# Patient Record
Sex: FEMALE | Race: WHITE
[De-identification: names, ages, dates, MRNs, and addresses within clinical notes are randomized per-mention and may not be internally consistent; named-entity substitution may affect disease eponyms.]

---

## 2018-02-03 ENCOUNTER — HOSPITAL ENCOUNTER (EMERGENCY)
Dept: HOSPITAL 61 - ER | Age: 38
Discharge: HOME | End: 2018-02-03
Payer: SELF-PAY

## 2018-02-03 DIAGNOSIS — E87.6: ICD-10-CM

## 2018-02-03 DIAGNOSIS — R00.0: Primary | ICD-10-CM

## 2018-02-03 DIAGNOSIS — R42: ICD-10-CM

## 2018-02-03 DIAGNOSIS — T50.5X5A: ICD-10-CM
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
## 2018-02-03 LAB
ADD MAN DIFF?: NO
ALBUMIN SERPL-MCNC: 4.3 G/DL (ref 3.4–5)
ALBUMIN/GLOB SERPL: 1 {RATIO} (ref 1–1.7)
ALP SERPL-CCNC: 61 U/L (ref 46–116)
ALT (SGPT): 20 U/L (ref 14–59)
AMPHETAMINE/METHAMPHETAMINE: (no result)
ANION GAP SERPL CALC-SCNC: 13 MMOL/L (ref 6–14)
AST SERPL-CCNC: 17 U/L (ref 15–37)
BACTERIA,URINE: 0 /HPF
BARBITURATES: (no result)
BASO #: 0.1 X10^3/UL (ref 0–0.2)
BASO %: 1 % (ref 0–3)
BENZODIAZEPINES: (no result)
BILIRUBIN,URINE: NEGATIVE
BLOOD UREA NITROGEN: 8 MG/DL (ref 7–20)
BUN/CREAT SERPL: 13 (ref 6–20)
CALCIUM: 8.9 MG/DL (ref 8.5–10.1)
CANNABINOIDS: (no result)
CHLORIDE: 101 MMOL/L (ref 98–107)
CLARITY,URINE: CLEAR
CO2 SERPL-SCNC: 22 MMOL/L (ref 21–32)
COCAINE: (no result)
COLOR,URINE: YELLOW
CREAT SERPL-MCNC: 0.6 MG/DL (ref 0.6–1)
D-DIMER: 0.28 UG/MLFEU (ref 0–0.5)
EOS #: 0.1 X10^3/UL (ref 0–0.7)
EOS %: 1 % (ref 0–3)
ETHANOL, URINE: (no result)
GFR SERPLBLD BASED ON 1.73 SQ M-ARVRAT: 113.1 ML/MIN
GLOBULIN SER-MCNC: 4.1 G/DL (ref 2.2–3.8)
GLUCOSE SERPL-MCNC: 118 MG/DL (ref 70–99)
GLUCOSE,URINE: NEGATIVE MG/DL
HCG SERPL-ACNC: 11.5 X10^3/UL (ref 4–11)
HEMATOCRIT: 41.9 % (ref 36–47)
HEMOGLOBIN: 14.4 G/DL (ref 12–15.5)
LYMPH #: 3.3 X10^3/UL (ref 1–4.8)
LYMPH %: 28 % (ref 24–48)
MEAN CORPUSCULAR HEMOGLOBIN: 29 PG (ref 25–35)
MEAN CORPUSCULAR HGB CONC: 34 G/DL (ref 31–37)
MEAN CORPUSCULAR VOLUME: 85 FL (ref 79–100)
METHADONE: (no result)
MONO #: 0.8 X10^3/UL (ref 0–1.1)
MONO %: 7 % (ref 0–9)
NEUT #: 7.3 X10^3UL (ref 1.8–7.7)
NEUT %: 63 % (ref 31–73)
NITRITE,URINE: NEGATIVE
OPIATES: (no result)
PH,URINE: 7
PHENCYCLIDINE: (no result)
PLATELET COUNT: 373 X10^3/UL (ref 140–400)
POTASSIUM SERPL-SCNC: 2.7 MMOL/L (ref 3.5–5.1)
PROTEIN,URINE: NEGATIVE MG/DL
RBC,URINE: 0 /HPF (ref 0–2)
RED BLOOD COUNT: 4.97 X10^6/UL (ref 3.5–5.4)
RED CELL DISTRIBUTION WIDTH: 13.3 % (ref 11.5–14.5)
SODIUM: 136 MMOL/L (ref 136–145)
SPECIFIC GRAVITY,URINE: <=1.005
SQUAMOUS EPITHELIAL CELL,UR: (no result) /LPF
THYROID STIM HORMONE (TSH): 1.77 UIU/ML (ref 0.36–3.74)
TOTAL BILIRUBIN: 0.2 MG/DL (ref 0.2–1)
TOTAL PROTEIN: 8.4 G/DL (ref 6.4–8.2)
URINE HCG POC: (no result)
UROBILINOGEN,URINE: 0.2 MG/DL
WBC,URINE: 0 /HPF (ref 0–4)

## 2018-02-03 PROCEDURE — 81025 URINE PREGNANCY TEST: CPT

## 2018-02-03 PROCEDURE — 81001 URINALYSIS AUTO W/SCOPE: CPT

## 2018-02-03 PROCEDURE — 99285 EMERGENCY DEPT VISIT HI MDM: CPT

## 2018-02-03 PROCEDURE — 93005 ELECTROCARDIOGRAM TRACING: CPT

## 2018-02-03 PROCEDURE — 36415 COLL VENOUS BLD VENIPUNCTURE: CPT

## 2018-02-03 PROCEDURE — 80307 DRUG TEST PRSMV CHEM ANLYZR: CPT

## 2018-02-03 PROCEDURE — 96361 HYDRATE IV INFUSION ADD-ON: CPT

## 2018-02-03 PROCEDURE — 80053 COMPREHEN METABOLIC PANEL: CPT

## 2018-02-03 PROCEDURE — 85025 COMPLETE CBC W/AUTO DIFF WBC: CPT

## 2018-02-03 PROCEDURE — 85379 FIBRIN DEGRADATION QUANT: CPT

## 2018-02-03 PROCEDURE — 84443 ASSAY THYROID STIM HORMONE: CPT

## 2018-02-03 PROCEDURE — 96374 THER/PROPH/DIAG INJ IV PUSH: CPT

## 2018-02-03 RX ADMIN — KETOROLAC TROMETHAMINE 1 MG: 30 INJECTION, SOLUTION INTRAMUSCULAR at 15:09

## 2018-02-03 RX ADMIN — BACITRACIN 1 MLS/HR: 5000 INJECTION, POWDER, FOR SOLUTION INTRAMUSCULAR at 14:02

## 2019-07-07 ENCOUNTER — HOSPITAL ENCOUNTER (EMERGENCY)
Dept: HOSPITAL 61 - ER | Age: 39
Discharge: HOME | End: 2019-07-07
Payer: COMMERCIAL

## 2019-07-07 VITALS — SYSTOLIC BLOOD PRESSURE: 130 MMHG | DIASTOLIC BLOOD PRESSURE: 76 MMHG

## 2019-07-07 VITALS — WEIGHT: 193 LBS | BODY MASS INDEX: 32.95 KG/M2 | HEIGHT: 64 IN

## 2019-07-07 DIAGNOSIS — N13.30: ICD-10-CM

## 2019-07-07 DIAGNOSIS — R31.9: ICD-10-CM

## 2019-07-07 DIAGNOSIS — N39.0: Primary | ICD-10-CM

## 2019-07-07 DIAGNOSIS — J02.9: ICD-10-CM

## 2019-07-07 DIAGNOSIS — N83.201: ICD-10-CM

## 2019-07-07 LAB
ALBUMIN SERPL-MCNC: 4 G/DL (ref 3.4–5)
ALBUMIN/GLOB SERPL: 1 {RATIO} (ref 1–1.7)
ALP SERPL-CCNC: 68 U/L (ref 46–116)
ALT SERPL-CCNC: 58 U/L (ref 14–59)
AMPHETAMINE/METHAMPHETAMINE: (no result)
ANION GAP SERPL CALC-SCNC: 9 MMOL/L (ref 6–14)
APTT PPP: YELLOW S
AST SERPL-CCNC: 45 U/L (ref 15–37)
BACTERIA #/AREA URNS HPF: (no result) /HPF
BARBITURATES UR-MCNC: (no result) UG/ML
BASOPHILS # BLD AUTO: 0.1 X10^3/UL (ref 0–0.2)
BASOPHILS NFR BLD: 1 % (ref 0–3)
BENZODIAZ UR-MCNC: (no result) UG/L
BILIRUB SERPL-MCNC: 0.4 MG/DL (ref 0.2–1)
BILIRUB UR QL STRIP: NEGATIVE
BUN SERPL-MCNC: 8 MG/DL (ref 7–20)
BUN/CREAT SERPL: 10 (ref 6–20)
CALCIUM SERPL-MCNC: 9 MG/DL (ref 8.5–10.1)
CANNABINOIDS UR-MCNC: (no result) UG/L
CHLORIDE SERPL-SCNC: 100 MMOL/L (ref 98–107)
CO2 SERPL-SCNC: 27 MMOL/L (ref 21–32)
COCAINE UR-MCNC: (no result) NG/ML
CREAT SERPL-MCNC: 0.8 MG/DL (ref 0.6–1)
EOSINOPHIL NFR BLD: 0.2 X10^3/UL (ref 0–0.7)
EOSINOPHIL NFR BLD: 3 % (ref 0–3)
ERYTHROCYTE [DISTWIDTH] IN BLOOD BY AUTOMATED COUNT: 13.9 % (ref 11.5–14.5)
FIBRINOGEN PPP-MCNC: CLEAR MG/DL
GFR SERPLBLD BASED ON 1.73 SQ M-ARVRAT: 80.7 ML/MIN
GLOBULIN SER-MCNC: 4.2 G/DL (ref 2.2–3.8)
GLUCOSE SERPL-MCNC: 159 MG/DL (ref 70–99)
HCT VFR BLD CALC: 41.1 % (ref 36–47)
HGB BLD-MCNC: 14.4 G/DL (ref 12–15.5)
LYMPHOCYTES # BLD: 1.8 X10^3/UL (ref 1–4.8)
LYMPHOCYTES NFR BLD AUTO: 25 % (ref 24–48)
MCH RBC QN AUTO: 30 PG (ref 25–35)
MCHC RBC AUTO-ENTMCNC: 35 G/DL (ref 31–37)
MCV RBC AUTO: 86 FL (ref 79–100)
METHADONE SERPL-MCNC: (no result) NG/ML
MONO #: 1.1 X10^3/UL (ref 0–1.1)
MONOCYTES NFR BLD: 14 % (ref 0–9)
NEUT #: 4.3 X10^3UL (ref 1.8–7.7)
NEUTROPHILS NFR BLD AUTO: 58 % (ref 31–73)
NITRITE UR QL STRIP: NEGATIVE
OPIATES UR-MCNC: (no result) NG/ML
PCP SERPL-MCNC: (no result) MG/DL
PH UR STRIP: 7 [PH]
PLATELET # BLD AUTO: 311 X10^3/UL (ref 140–400)
POTASSIUM SERPL-SCNC: 3.3 MMOL/L (ref 3.5–5.1)
PROT SERPL-MCNC: 8.2 G/DL (ref 6.4–8.2)
PROT UR STRIP-MCNC: NEGATIVE MG/DL
RBC # BLD AUTO: 4.78 X10^6/UL (ref 3.5–5.4)
RBC #/AREA URNS HPF: (no result) /HPF (ref 0–2)
SODIUM SERPL-SCNC: 136 MMOL/L (ref 136–145)
SQUAMOUS #/AREA URNS LPF: (no result) /LPF
UROBILINOGEN UR-MCNC: 0.2 MG/DL
WBC # BLD AUTO: 7.4 X10^3/UL (ref 4–11)
WBC #/AREA URNS HPF: (no result) /HPF (ref 0–4)

## 2019-07-07 PROCEDURE — 36415 COLL VENOUS BLD VENIPUNCTURE: CPT

## 2019-07-07 PROCEDURE — 87070 CULTURE OTHR SPECIMN AEROBIC: CPT

## 2019-07-07 PROCEDURE — 81001 URINALYSIS AUTO W/SCOPE: CPT

## 2019-07-07 PROCEDURE — 85025 COMPLETE CBC W/AUTO DIFF WBC: CPT

## 2019-07-07 PROCEDURE — 87880 STREP A ASSAY W/OPTIC: CPT

## 2019-07-07 PROCEDURE — 74176 CT ABD & PELVIS W/O CONTRAST: CPT

## 2019-07-07 PROCEDURE — 99285 EMERGENCY DEPT VISIT HI MDM: CPT

## 2019-07-07 PROCEDURE — 87086 URINE CULTURE/COLONY COUNT: CPT

## 2019-07-07 PROCEDURE — 80307 DRUG TEST PRSMV CHEM ANLYZR: CPT

## 2019-07-07 PROCEDURE — 81025 URINE PREGNANCY TEST: CPT

## 2019-07-07 PROCEDURE — 93005 ELECTROCARDIOGRAM TRACING: CPT

## 2019-07-07 PROCEDURE — 80053 COMPREHEN METABOLIC PANEL: CPT

## 2019-07-07 NOTE — RAD
Exam performed: CT abdomen and pelvis without contrast

 

HISTORY: Bilateral flank pain.

 

DATE OF SERVICE: 7/7/2019.

 

COMPARISON: None available

 

TECHNIQUE: Contiguous helical acquisitions are obtained through the 

abdomen and pelvis without IV contrast. Sagittal and coronal reformatted 

images are obtained and reviewed.

 

FINDINGS:

 

Lack of IV contrast limits evaluation of abdominal viscera, however there 

is mild hepatomegaly. Spleen, pancreas and gallbladder are normal. Both 

adrenal glands and bilateral kidneys are normal in size. Mild right 

hydronephrosis and mild dilation of the right upper ureter is seen. No 

obstructing calculus is identified. The distal third of ureter is not 

clearly seen. Aorta is normal in caliber. Small and large bowel loops are 

nondilated and unremarkable. Visualized presumed appendix is normal.

 

Urinary bladder is normal. Uterus is anteverted. There is IUD in place. 

Right ovarian cysts likely physiological. No free fluid. Bones are 

essentially normal.

 

IMPRESSION:

 

1.  Mild fullness of the right collecting system and upper ureter without 

definite obstructing calculus. Findings may be related to mild stricture 

or recently passed calculus although no calculus is seen in the urinary 

bladder.

2.  Right ovarian cysts likely physiological.

3.  Mild hepatomegaly.

 

PQRS Compliance Statement:

 

One or more of the following individualized dose reduction techniques were

utilized for this examination:  

1. Automated exposure control  

2. Adjustment of the mA and/or kV according to patient size  

3. Use of iterative reconstruction technique

 

Electronically signed by: Sherie Villegas MD (7/7/2019 2:58 PM) [facility]

## 2019-07-08 NOTE — EKG
[facility]

              [address] [place] [address]

Test Date:    2019               Test Time:    12:56:09

Pat Name:     DEVEN ARAUJO Department:   

Patient ID:   [id number]           Room:          

Gender:       F                        Technician:   

:          1980               Requested By: CLEMENT KATZ

Order Number: [id number]           Reading MD:     

                                 Measurements

Intervals                              Axis          

Rate:         132                      P:            

PA:                                    QRS:          54

QRSD:         72                       T:            31

QT:           342                                    

QTc:          510                                    

                           Interpretive Statements

SUPRAVENTRICULAR TACHYCARDIA

T ABNORMALITY IN ANTERIOR LEADS

ABNORMAL ECG

RI6.01

No previous ECG available for comparison

## 2021-09-01 ENCOUNTER — HOSPITAL ENCOUNTER (EMERGENCY)
Dept: HOSPITAL 61 - ER | Age: 41
Discharge: HOME | End: 2021-09-01
Payer: COMMERCIAL

## 2021-09-01 VITALS — HEIGHT: 65 IN | BODY MASS INDEX: 30.71 KG/M2 | WEIGHT: 184.31 LBS

## 2021-09-01 VITALS — DIASTOLIC BLOOD PRESSURE: 66 MMHG | SYSTOLIC BLOOD PRESSURE: 110 MMHG

## 2021-09-01 DIAGNOSIS — N12: Primary | ICD-10-CM

## 2021-09-01 LAB
ALBUMIN SERPL-MCNC: 3.6 G/DL (ref 3.4–5)
ALBUMIN/GLOB SERPL: 0.9 {RATIO} (ref 1–1.7)
ALP SERPL-CCNC: 50 U/L (ref 46–116)
ALT SERPL-CCNC: 26 U/L (ref 14–59)
ANION GAP SERPL CALC-SCNC: 9 MMOL/L (ref 6–14)
APTT PPP: YELLOW S
AST SERPL-CCNC: 21 U/L (ref 15–37)
BACTERIA #/AREA URNS HPF: (no result) /HPF
BASOPHILS # BLD AUTO: 0.1 X10^3/UL (ref 0–0.2)
BASOPHILS NFR BLD: 0 % (ref 0–3)
BILIRUB SERPL-MCNC: 0.6 MG/DL (ref 0.2–1)
BILIRUB UR QL STRIP: NEGATIVE
BUN SERPL-MCNC: 12 MG/DL (ref 7–20)
BUN/CREAT SERPL: 17 (ref 6–20)
CALCIUM SERPL-MCNC: 8.8 MG/DL (ref 8.5–10.1)
CHLORIDE SERPL-SCNC: 104 MMOL/L (ref 98–107)
CO2 SERPL-SCNC: 26 MMOL/L (ref 21–32)
CREAT SERPL-MCNC: 0.7 MG/DL (ref 0.6–1)
EOSINOPHIL NFR BLD: 0 % (ref 0–3)
EOSINOPHIL NFR BLD: 0.1 X10^3/UL (ref 0–0.7)
ERYTHROCYTE [DISTWIDTH] IN BLOOD BY AUTOMATED COUNT: 13.5 % (ref 11.5–14.5)
FIBRINOGEN PPP-MCNC: (no result) MG/DL
GFR SERPLBLD BASED ON 1.73 SQ M-ARVRAT: 92.7 ML/MIN
GLUCOSE SERPL-MCNC: 94 MG/DL (ref 70–99)
HCT VFR BLD CALC: 39.1 % (ref 36–47)
HGB BLD-MCNC: 13.6 G/DL (ref 12–15.5)
LIPASE: 96 U/L (ref 73–393)
LYMPHOCYTES # BLD: 1.7 X10^3/UL (ref 1–4.8)
LYMPHOCYTES NFR BLD AUTO: 13 % (ref 24–48)
MCH RBC QN AUTO: 30 PG (ref 25–35)
MCHC RBC AUTO-ENTMCNC: 35 G/DL (ref 31–37)
MCV RBC AUTO: 87 FL (ref 79–100)
MONO #: 0.8 X10^3/UL (ref 0–1.1)
MONOCYTES NFR BLD: 6 % (ref 0–9)
NEUT #: 11 X10^3/UL (ref 1.8–7.7)
NEUTROPHILS NFR BLD AUTO: 81 % (ref 31–73)
NITRITE UR QL STRIP: NEGATIVE
PH UR STRIP: 6 [PH]
PLATELET # BLD AUTO: 332 X10^3/UL (ref 140–400)
POTASSIUM SERPL-SCNC: 3.8 MMOL/L (ref 3.5–5.1)
PROT SERPL-MCNC: 7.5 G/DL (ref 6.4–8.2)
PROT UR STRIP-MCNC: 30 MG/DL
RBC # BLD AUTO: 4.51 X10^6/UL (ref 3.5–5.4)
RBC #/AREA URNS HPF: (no result) /HPF (ref 0–2)
SODIUM SERPL-SCNC: 139 MMOL/L (ref 136–145)
UROBILINOGEN UR-MCNC: 0.2 MG/DL
WBC # BLD AUTO: 13.6 X10^3/UL (ref 4–11)
WBC #/AREA URNS HPF: (no result) /HPF (ref 0–4)

## 2021-09-01 PROCEDURE — 83690 ASSAY OF LIPASE: CPT

## 2021-09-01 PROCEDURE — 96375 TX/PRO/DX INJ NEW DRUG ADDON: CPT

## 2021-09-01 PROCEDURE — 96374 THER/PROPH/DIAG INJ IV PUSH: CPT

## 2021-09-01 PROCEDURE — 36415 COLL VENOUS BLD VENIPUNCTURE: CPT

## 2021-09-01 PROCEDURE — 99285 EMERGENCY DEPT VISIT HI MDM: CPT

## 2021-09-01 PROCEDURE — 81001 URINALYSIS AUTO W/SCOPE: CPT

## 2021-09-01 PROCEDURE — 81025 URINE PREGNANCY TEST: CPT

## 2021-09-01 PROCEDURE — 74177 CT ABD & PELVIS W/CONTRAST: CPT

## 2021-09-01 PROCEDURE — 80053 COMPREHEN METABOLIC PANEL: CPT

## 2021-09-01 PROCEDURE — 85025 COMPLETE CBC W/AUTO DIFF WBC: CPT

## 2021-09-01 PROCEDURE — 87086 URINE CULTURE/COLONY COUNT: CPT

## 2021-09-01 NOTE — PHYS DOC
Past Medical History


Past Medical History:  No Pertinent History


Past Surgical History:  No Surgical History


Smoking Status:  Never Smoker


Alcohol Use:  Rarely


Drug Use:  None





General Adult


EDM:


Chief Complaint:  PAIN ON URINATION





HPI:


HPI:


41-year-old female with a history of UTI presents to the emergency department 

with bilateral flank pain, suprapubic/right quadrant pain for the past several 

days with gradual onset.  She reports her pain is related to her urination, not 

made better by anything.  Moderate in severity.  She has had symptoms like this 

in the past where she was diagnosed with a urinary tract infection.  She admits 

to chills, subjective fever without measuring her temperature. the patient 

denies nausea, vomiting, diarrhea, chest pain, shortness of breath, cough, 

recent trauma, or any other complaints.





Review of Systems:


Review of Systems:


Review of systems is otherwise negative except for what was mentioned in the 

HPI.





Heart Score:


C/O Chest Pain:  No





Allergies:


Allergies:





Allergies








Coded Allergies Type Severity Reaction Last Updated Verified


 


  No Known Drug Allergies    2/3/18 No











Physical Exam:


PE:


Constitutional: No acute distress, non-toxic appearance.


HENT: Atraumatic, bilateral external ears normal, nose normal.


Eyes: PERRLA, EOMI, conjunctiva normal, no discharge.


Neck: Normal range of motion, supple, no stridor.


Cardiovascular: Heart rate regular rhythm. 2+ radial pulses


Lungs & Thorax: No respiratory distress, symmetrical expansion.  Lungs clear to 

auscultation bilaterally


Abdomen: Soft, minimal suprapubic and right lower quadrant tenderness to 

palpation, no rebound or guarding.  No CVA tenderness.


Skin: Warm, dry.  No rash.


Extremities: No tenderness, no cyanosis, ROM intact, no edema.


Neurologic: Alert and oriented X 3, normal motor function, normal sensory 

function, no focal deficits noted. Non ataxic gait. GCS 15.


Psychologic: Affect normal, judgment normal, mood normal.





Current Patient Data:


Labs:





Laboratory Tests








Test


 9/1/21


08:55 9/1/21


09:07 9/1/21


09:35


 


Urine Collection Type Unknown   


 


Urine Color Yellow   


 


Urine Clarity Cloudy   


 


Urine pH 6.0 (<5.0-8.0)   


 


Urine Specific Gravity


 1.010


(1.000-1.030) 


 





 


Urine Protein


 30 mg/dL


(NEG-TRACE) 


 





 


Urine Glucose (UA)


 Negative mg/dL


(NEG) 


 





 


Urine Ketones (Stick)


 Negative mg/dL


(NEG) 


 





 


Urine Blood Large (NEG)   


 


Urine Nitrite Negative (NEG)   


 


Urine Bilirubin Negative (NEG)   


 


Urine Urobilinogen Dipstick


 0.2 mg/dL (0.2


mg/dL) 


 





 


Urine Leukocyte Esterase Large (NEG)   


 


Urine RBC


 6-10 /HPF


(0-2) 


 





 


Urine WBC


 Tntc /HPF


(0-4) 


 





 


Urine Bacteria


 Many /HPF


(0-FEW) 


 





 


Bedside Urine HCG, Qualitative


 


 Hcg negative


(Negative) 





 


White Blood Count


 


 


 13.6 x10^3/uL


(4.0-11.0)


 


Red Blood Count


 


 


 4.51 x10^6/uL


(3.50-5.40)


 


Hemoglobin


 


 


 13.6 g/dL


(12.0-15.5)


 


Hematocrit


 


 


 39.1 %


(36.0-47.0)


 


Mean Corpuscular Volume   87 fL () 


 


Mean Corpuscular Hemoglobin   30 pg (25-35) 


 


Mean Corpuscular Hemoglobin


Concent 


 


 35 g/dL


(31-37)


 


Red Cell Distribution Width


 


 


 13.5 %


(11.5-14.5)


 


Platelet Count


 


 


 332 x10^3/uL


(140-400)


 


Neutrophils (%) (Auto)   81 % (31-73) 


 


Lymphocytes (%) (Auto)   13 % (24-48) 


 


Monocytes (%) (Auto)   6 % (0-9) 


 


Eosinophils (%) (Auto)   0 % (0-3) 


 


Basophils (%) (Auto)   0 % (0-3) 


 


Neutrophils # (Auto)


 


 


 11.0 x10^3/uL


(1.8-7.7)


 


Lymphocytes # (Auto)


 


 


 1.7 x10^3/uL


(1.0-4.8)


 


Monocytes # (Auto)


 


 


 0.8 x10^3/uL


(0.0-1.1)


 


Eosinophils # (Auto)


 


 


 0.1 x10^3/uL


(0.0-0.7)


 


Basophils # (Auto)


 


 


 0.1 x10^3/uL


(0.0-0.2)


 


Sodium Level


 


 


 139 mmol/L


(136-145)


 


Potassium Level


 


 


 3.8 mmol/L


(3.5-5.1)


 


Chloride Level


 


 


 104 mmol/L


()


 


Carbon Dioxide Level


 


 


 26 mmol/L


(21-32)


 


Anion Gap   9 (6-14) 


 


Blood Urea Nitrogen


 


 


 12 mg/dL


(7-20)


 


Creatinine


 


 


 0.7 mg/dL


(0.6-1.0)


 


Estimated GFR


(Cockcroft-Gault) 


 


 92.7 





 


BUN/Creatinine Ratio   17 (6-20) 


 


Glucose Level


 


 


 94 mg/dL


(70-99)


 


Calcium Level


 


 


 8.8 mg/dL


(8.5-10.1)


 


Total Bilirubin


 


 


 0.6 mg/dL


(0.2-1.0)


 


Aspartate Amino Transf


(AST/SGOT) 


 


 21 U/L (15-37) 





 


Alanine Aminotransferase


(ALT/SGPT) 


 


 26 U/L (14-59) 





 


Alkaline Phosphatase


 


 


 50 U/L


()


 


Total Protein


 


 


 7.5 g/dL


(6.4-8.2)


 


Albumin


 


 


 3.6 g/dL


(3.4-5.0)


 


Albumin/Globulin Ratio   0.9 (1.0-1.7) 


 


Lipase


 


 


 96 U/L


()








Vital Signs:





Vital Signs








  Date Time  Temp Pulse Resp B/P (MAP) Pulse Ox O2 Delivery O2 Flow Rate FiO2


 


9/1/21 11:50  76 18 97/69 (78) 96 Room Air  


 


9/1/21 11:20  76 18 98/57 (71) 98 Room Air  


 


9/1/21 10:50  78 18 104/52 (69) 98 Room Air  


 


9/1/21 10:25   12  98 Room Air  


 


9/1/21 10:20  106 18 120/72 (88) 97 Room Air  


 


9/1/21 09:56  82 18 115/72 (86) 97 Room Air  


 


9/1/21 09:26  72 18 109/64 (79) 97 Room Air  


 


9/1/21 09:00 98.5 107 18 135/87 99 Room Air  





 98.5       











Radiology/Procedures:


Radiology/Procedures:





CT ABDOMEN+PELVIS W 





History: abdominal pain rlq  





Comparison: 7/7/2019





Technique: After administration of intravenous contrast, helical CT of the 

abdomen and pelvis was performed from the lung bases through the ischial 

tuberosities. Coronal and sagittal reconstructions were obtained. 75 mL of Omni

paque 300 were used. One or more of the following dose reduction techniques were

utilized: Automated exposure control (AEC), Adjustment of mA and/or kV according

to patient size, Use of iterative reconstruction technique such as ASiR, CT scan

done according to ALARA and image gently/image wisely





Abdomen Findings:


The visualized lung bases are clear.





The liver, gallbladder, pancreas, spleen, and bilateral adrenal glands are 

normal. 





Mild right pelviectasis with hyperemia of the renal pelvis and right ureter.   





The visualized loops of small bowel are normal. The visualized loops of large 

bowel are normal. There is no evidence of bowel obstruction. Appendix is not 

seen. 





There is no free fluid.





There is no mesenteric or retroperitoneal adenopathy. 





The abdominal aorta is normal in caliber. 





Pelvis Findings:


Urinary bladder is normal. Uterus is present. No pelvic free fluid. There is no 

pelvic or inguinal adenopathy.  





There is no acute bony abnormality.  





IMPRESSION:


Right renal pelviectasis with hyperemia of the renal pelvis and ureter, which 

may represent an ascending urinary tract infection.   





Electronically signed by: Baldemar Arechiga MD (9/1/2021 10:39 AM)





Course & Med Decision Making:


Course & Med Decision Making


Patient with a pyelonephritis, we will treat her outpatient with Levaquin.  She 

was given a dose of Rocephin in the emergency department.  She appears stable 

for discharge, looks well clinically.  She was advised to return to the 

emergency department if she has any further complications or symptoms that 

progress


Family was used as an  per patient preference





My Orders - BLACK SUAREZ DO








Procedure Category Date Status





  Time 


 


Ua, Cult If Indicated LAB 9/1/21 Complete





  08:59 


 


Urine Pregnancy Test MARISSA 9/1/21 In Process





  08:59 


 


Cbc W Autodiff LAB 9/1/21 Complete





  09:07 


 


Comprehensive LAB 9/1/21 Complete





Metabolic Panel  09:07 


 


Lipase LAB 9/1/21 Complete





  09:07 


 


Ct Abd Pelv W/ Iv CT 9/1/21 Resulted





Contrst Only  09:07 


 


Morphine Sulfate PHA 9/1/21 Complete





 (Morphine Sulfate)  09:15 


 


Urine Culture DUNIA 9/1/21 In Process





  09:28 


 


Ceftriaxone Iv Push PHA 9/1/21 Complete





 (Rocephin)  10:00 


 


Iohexol 300 Mg/Ml PHA 9/1/21 Complete





 (Omnipaque 300 Mg/Ml)  10:15 


 


Contrast Given -- PHA 9/1/21 In Process





Info Only (Contrast Gi  10:15 











Departure


Departure


Impression:  


   Primary Impression:  


   Pyelonephritis


Disposition:  01 HOME / SELF CARE / HOMELESS


Condition:  STABLE


Referrals:  


NO PCP (PCP)


Patient Instructions:  Pyelonephritis, Adult, Easy-to-Read





Additional Instructions:  


You were seen for a pyelonephritis. Please continue to take the antibiotics as 

prescribed. You should return to the ED if you develop worsening pain, fever, 

flank pain, inability to eat or drink, or any other new or concerning symptoms. 







You have been given a prescription for Levaquin. This medicine is an antibiotic 

for pyelonephritis. 





Please take as prescribed for the full course of the prescription. Do not stop 

taking the medicine early if you feel better, as this could risk building 

antibiotic resistance and may put you at risk for a more harmful infection 

later. 





The most common side effect of antibiotics include nausea, vomiting, diarrhea 

and rash. Please come to be evaluated if you develop any symptoms that are 

concerning to you. One major adverse effect of antibiotics is the development of

a diarrheal illness called c. diff colitis, if you develop an excessive amount 

of diarrhea or are concerned about this please return to the ER or consult a 

physician. 





You were seen in the emergency department and your health condition was deemed 

not to require admission to the hospital.  It is important to realize that we 

can only evaluate you during the time that you are in her department.  

Occasionally health conditions can worsen upon leaving the emergency department.

 If this were to happen, please return to and allow us the opportunity to 

reevaluate you.  It is a pleasure to take care of your health needs.  Return to 

the ER if your symptoms worsen, do not improve, or if you develop additional 

symptoms that are concerning to you


Scripts


Levofloxacin (LEVOFLOXACIN) 750 Mg Tablet


1 TAB PO DAILY for 7 Days, #7 TAB


   Prov: BLACK SUAREZ DO         9/1/21











BLACK SUAREZ DO              Sep 1, 2021 09:11

## 2021-09-01 NOTE — RAD
CT ABDOMEN+PELVIS W 



History: abdominal pain rlq  



Comparison: 7/7/2019



Technique: After administration of intravenous contrast, helical CT of the abdomen and pelvis was per
formed from the lung bases through the ischial tuberosities. Coronal and sagittal reconstructions wer
e obtained. 75 mL of Omnipaque 300 were used. One or more of the following dose reduction techniques 
were utilized: Automated exposure control (AEC), Adjustment of mA and/or kV according to patient size
, Use of iterative reconstruction technique such as ASiR, CT scan done according to ALARA and image g
ently/image wisely



Abdomen Findings:

The visualized lung bases are clear.



The liver, gallbladder, pancreas, spleen, and bilateral adrenal glands are normal. 



Mild right pelviectasis with hyperemia of the renal pelvis and right ureter.   



The visualized loops of small bowel are normal. The visualized loops of large bowel are normal. There
 is no evidence of bowel obstruction. Appendix is not seen. 



There is no free fluid.



There is no mesenteric or retroperitoneal adenopathy. 



The abdominal aorta is normal in caliber. 



Pelvis Findings:

Urinary bladder is normal. Uterus is present. No pelvic free fluid. There is no pelvic or inguinal ad
enopathy.  



There is no acute bony abnormality.  



IMPRESSION:

Right renal pelviectasis with hyperemia of the renal pelvis and ureter, which may represent an ascend
ing urinary tract infection.   



Electronically signed by: Baldemar Arechiga MD (9/1/2021 10:39 AM) [id number]

## 2021-12-31 ENCOUNTER — HOSPITAL ENCOUNTER (EMERGENCY)
Dept: HOSPITAL 61 - ER | Age: 41
Discharge: HOME | End: 2021-12-31
Payer: COMMERCIAL

## 2021-12-31 VITALS — WEIGHT: 183.42 LBS | BODY MASS INDEX: 31.31 KG/M2 | HEIGHT: 64 IN

## 2021-12-31 VITALS — DIASTOLIC BLOOD PRESSURE: 74 MMHG | SYSTOLIC BLOOD PRESSURE: 114 MMHG

## 2021-12-31 DIAGNOSIS — O20.0: Primary | ICD-10-CM

## 2021-12-31 DIAGNOSIS — Z3A.01: ICD-10-CM

## 2021-12-31 LAB
ALBUMIN SERPL-MCNC: 3.7 G/DL (ref 3.4–5)
ALBUMIN/GLOB SERPL: 0.9 {RATIO} (ref 1–1.7)
ALP SERPL-CCNC: 53 U/L (ref 46–116)
ALT SERPL-CCNC: 25 U/L (ref 14–59)
AMORPH SED URNS QL MICRO: PRESENT /HPF
ANION GAP SERPL CALC-SCNC: 9 MMOL/L (ref 6–14)
APTT PPP: YELLOW S
AST SERPL-CCNC: 22 U/L (ref 15–37)
BACTERIA #/AREA URNS HPF: 0 /HPF
BASOPHILS # BLD AUTO: 0.1 X10^3/UL (ref 0–0.2)
BASOPHILS NFR BLD: 1 % (ref 0–3)
BILIRUB SERPL-MCNC: 0.2 MG/DL (ref 0.2–1)
BILIRUB UR QL STRIP: NEGATIVE
BUN SERPL-MCNC: 9 MG/DL (ref 7–20)
BUN/CREAT SERPL: 15 (ref 6–20)
CALCIUM SERPL-MCNC: 8.5 MG/DL (ref 8.5–10.1)
CHLORIDE SERPL-SCNC: 104 MMOL/L (ref 98–107)
CO2 SERPL-SCNC: 26 MMOL/L (ref 21–32)
CREAT SERPL-MCNC: 0.6 MG/DL (ref 0.6–1)
EOSINOPHIL NFR BLD: 0.2 X10^3/UL (ref 0–0.7)
EOSINOPHIL NFR BLD: 2 % (ref 0–3)
ERYTHROCYTE [DISTWIDTH] IN BLOOD BY AUTOMATED COUNT: 13.7 % (ref 11.5–14.5)
FIBRINOGEN PPP-MCNC: (no result) MG/DL
GFR SERPLBLD BASED ON 1.73 SQ M-ARVRAT: 110.7 ML/MIN
GLUCOSE SERPL-MCNC: 92 MG/DL (ref 70–99)
HCT VFR BLD CALC: 41.5 % (ref 36–47)
HGB BLD-MCNC: 14.3 G/DL (ref 12–15.5)
LYMPHOCYTES # BLD: 2.7 X10^3/UL (ref 1–4.8)
LYMPHOCYTES NFR BLD AUTO: 33 % (ref 24–48)
MCH RBC QN AUTO: 29 PG (ref 25–35)
MCHC RBC AUTO-ENTMCNC: 35 G/DL (ref 31–37)
MCV RBC AUTO: 85 FL (ref 79–100)
MONO #: 0.6 X10^3/UL (ref 0–1.1)
MONOCYTES NFR BLD: 7 % (ref 0–9)
NEUT #: 4.7 X10^3/UL (ref 1.8–7.7)
NEUTROPHILS NFR BLD AUTO: 58 % (ref 31–73)
NITRITE UR QL STRIP: NEGATIVE
PH UR STRIP: 7.5 [PH]
PLATELET # BLD AUTO: 332 X10^3/UL (ref 140–400)
POTASSIUM SERPL-SCNC: 3.7 MMOL/L (ref 3.5–5.1)
PROT SERPL-MCNC: 7.8 G/DL (ref 6.4–8.2)
PROT UR STRIP-MCNC: NEGATIVE MG/DL
RBC # BLD AUTO: 4.87 X10^6/UL (ref 3.5–5.4)
RBC #/AREA URNS HPF: (no result) /HPF (ref 0–2)
SODIUM SERPL-SCNC: 139 MMOL/L (ref 136–145)
UROBILINOGEN UR-MCNC: 1 MG/DL
WBC # BLD AUTO: 8.1 X10^3/UL (ref 4–11)
WBC #/AREA URNS HPF: (no result) /HPF (ref 0–4)

## 2021-12-31 PROCEDURE — 86900 BLOOD TYPING SEROLOGIC ABO: CPT

## 2021-12-31 PROCEDURE — 86850 RBC ANTIBODY SCREEN: CPT

## 2021-12-31 PROCEDURE — 86901 BLOOD TYPING SEROLOGIC RH(D): CPT

## 2021-12-31 PROCEDURE — 96360 HYDRATION IV INFUSION INIT: CPT

## 2021-12-31 PROCEDURE — 81001 URINALYSIS AUTO W/SCOPE: CPT

## 2021-12-31 PROCEDURE — 80053 COMPREHEN METABOLIC PANEL: CPT

## 2021-12-31 PROCEDURE — 76801 OB US < 14 WKS SINGLE FETUS: CPT

## 2021-12-31 PROCEDURE — 99285 EMERGENCY DEPT VISIT HI MDM: CPT

## 2021-12-31 PROCEDURE — 36415 COLL VENOUS BLD VENIPUNCTURE: CPT

## 2021-12-31 PROCEDURE — 85025 COMPLETE CBC W/AUTO DIFF WBC: CPT

## 2021-12-31 PROCEDURE — 76817 TRANSVAGINAL US OBSTETRIC: CPT

## 2021-12-31 PROCEDURE — 81025 URINE PREGNANCY TEST: CPT

## 2021-12-31 PROCEDURE — 84702 CHORIONIC GONADOTROPIN TEST: CPT

## 2021-12-31 NOTE — PHYS DOC
Past Medical History


Past Medical History:  No Pertinent History


 (GIANNA,WINIFRED NINO APRN)


Past Surgical History:  No Surgical History


 [facility]


Smoking Status:  Never Smoker


Alcohol Use:  None


Drug Use:  None


 ([facility]UG[facility])





General Adult


EDM:


Chief Complaint:  VAGINAL BLEEDING PREGNANCY





HPI:


HPI:





Patient is a 41  year old female who presents with vaginal bleeding for the last

2 days that she states is like a regular period for her.  She is not going 

through more than 1 An hour.  She has her last menstrual period on November 1.  

She stated that she went to her doctor when she had a positive pregnancy test 

and had an ultrasound done.  She states she goes back in 1 week to follow-up for

another ultrasound.  She denies abdominal pain, back pain, large clots, 

shortness of breath, cough, fever, urinary symptoms, chest pain.





 (WINIFRED KATZ APRN)





Review of Systems:


Review of Systems:


Constitutional:   Denies fever or chills. []


Eyes:   Denies change in visual acuity. []


HENT:   Denies nasal congestion or sore throat. [] 


Respiratory:   Denies cough or shortness of breath. [] 


Cardiovascular:   Denies chest pain or edema. [] 


GI:   Denies abdominal pain, nausea, vomiting, bloody stools or diarrhea. [] 


:  Denies dysuria.+ Vaginal bleeding [] 


Musculoskeletal:   Denies back pain or joint pain. [] 


Integument:   Denies rash. [] 


Neurologic:   Denies headache, focal weakness or sensory changes. [] 


Endocrine:   Denies polyuria or polydipsia. [] 


Lymphatic:  Denies swollen glands. [] 


Psychiatric:  Denies depression or anxiety. []


 ([facility])





Heart Score:


C/O Chest Pain:  No


 ([facility])





Allergies:


Allergies:





Allergies








Coded Allergies Type Severity Reaction Last Updated Verified


 


  No Known Drug Allergies    2/3/18 No








 ([facility])





Physical Exam:


PE:





Constitutional: Well developed, well nourished, no acute distress, non-toxic 

appearance. []


HENT: Normocephalic, atraumatic, bilateral external ears normal, oropharynx 

moist, no oral exudates, nose normal. []


Eyes: PERRLA, EOMI, conjunctiva normal, no discharge. [] 


Neck: Normal range of motion, no tenderness, supple, no stridor. [] 


Cardiovascular:Heart rate regular rhythm, no murmur []


Lungs & Thorax:  Bilateral breath sounds clear to auscultation []


Abdomen: Bowel sounds normal, soft, no tenderness, no masses, no pulsatile m

asses. [] 


Skin: Warm, dry, no erythema, no rash. [] 


Back: No tenderness, no CVA tenderness. [] 


Extremities: No tenderness, no cyanosis, no clubbing, ROM intact, no edema. [] 


Neurologic: Alert and oriented X 3, normal motor function, normal sensory 

function, no focal deficits noted. []


Psychologic: Affect normal, judgement normal, mood normal. []





**Normal physical exam


 ([facility])


PE:


Constitutional: Well developed, well nourished, no acute distress


HENT: Normocephalic, atraumatic


Eyes: Conjunctiva normal, no discharge


Neck: Normal range of motion, supple


Lungs & Thorax:  No respiratory distress, equal chest rise and fall


Skin: Warm, dry, no erythema, no rash


Neurologic: Alert and oriented X 3, no focal deficits noted


Psychologic: Affect normal, judgment normal


 (MANDA SUN DO)


Current Patient Data:


Labs:





                                Laboratory Tests








Test


 12/31/21


17:29


 


POC Urine HCG, Qualitative


 Hcg positive


(Negative)








Vital Signs:





                                   Vital Signs








  Date Time  Temp Pulse Resp B/P (MAP) Pulse Ox O2 Delivery O2 Flow Rate FiO2


 


12/31/21 16:25 97.9 91 12 129/77 (94) 99 Room Air  





 97.9       








 ([facility])





EKG:


EKG:


[]


 ([facility]





Radiology/Procedures:


Radiology/Procedures:


[]


 ([facility])


Radiology/Procedures:


PROCEDURE: OB <14 WKS W/TV





Transvaginal obstetric ultrasound less than 14 weeks





HISTORY: Vaginal bleeding.





TECHNIQUE: Transvaginal sonography with grayscale and M-mode Doppler duplex 

Doppler sonography





FINDINGS: Anteverted uterus measures 9.0 x 5.6 x 5.4 cm. 1 cm cervical nabothian

 cyst. There is an intrauterine gestational sac with some surrounding chorion 

and the mean gestational sac diameter is 0.67 cm estimated sonographic 

gestational age of 5 weeks 3 days and date of delivery August 30, 2022. There is

 a miniscule subcentimeter subchorionic hemorrhage noted. No yolk sac or fetal 

pole evident. Left ovary could not be visualized. No pelvic fluid. Right ovary 

measures 2.5 x 3.4 x 2.2 cm with a few follicles and intact blood flow. No 

evidence of a right adnexal mass.





IMPRESSION:


1. Single intrauterine gestational sac with no yolk sac or fetal pole at this 

time with estimated sonographic gestational age of 5 weeks 3 days based on the 

mean gestational sac diameter. This most likely represents a very early 

intrauterine pregnancy with no detectable embryo due to the early gestational ag

e at this time. Clinical and sonographic follow-up is advised to document 

eventual development of a viable fetus to exclude the possibility of anembryonic

 gestation, or the less likely possibility of a pseudogestational sac from a 

sonographically occult ectopic.


2. Left ovary could not be visualized. The right ovary is normal.





Electronically signed by: Gavin Patricio MD (12/31/2021 11:37 PM) [facility]


 (MANDA SUN DO)


Course & Med Decision Making:


Course & Med Decision Making


Pertinent Labs and Imaging studies reviewed. (See chart for details)





See HPI.  Alert and oriented x4.  Ambulatory steady gait.  Speaks in full clear 

sentences.  No CVA tenderness.  Abdomen is soft and nontender.  Skin pink warm 

and dry.  Vital signs within normal limits.  Afebrile. 





2245: Signed out to Dr. Sun.  Awaiting ultrasound.


[]


 (WINIFRED KATZ)


Course & Med Decision Making


2100- Sign out received from Winifred MCDANIEL for patient pending US results concerning

 for threatened miscarriage.  BHCG consistent for findings on US likely due to 

early pregnancy.  RH +. 





Patient seen and evaluated by myself.  Copies of BHCG result and ABO results 

provided to patient. 





Patient stable for discharge with outpatient follow-up with PCP/OB-GYN. 

Discussed findings and plan with patient and spouse, who acknowledge 

understanding and agreement.


 (MANDA SUN DO)


Dragon Disclaimer:


Dragon Disclaimer:


This electronic medical record was generated, in whole or in part, using a voice

 recognition dictation system.


 (WINIFRED KATZ)





Departure


Departure


Impression:  


   Primary Impression:  


   Threatened miscarriage in early pregnancy


Disposition:  01 HOME / SELF CARE / HOMELESS


Condition:  STABLE


Referrals:  


NO PCP (PCP)








MANDA ARMENDARIZ MD


Patient Instructions:  Threatened Miscarriage, Easy-to-Read





Additional Instructions:  


Please follow closely with your OB/GYN for further evaluation and for repeat 

[facility] 72h.





Attending Signature


Attending Signature


I have personally interviewed and examined the patient. All charts, labs, and 

imaging studies were reviewed. I agree with the PA/NP's findings, exam, and 

plan.


 (MANDA SUN DO)











WINIFRED KATZ            Dec 31, 2021 17:45


MANDA SUN DO             Dec 31, 2021 23:09

## 2021-12-31 NOTE — RAD
Transvaginal obstetric ultrasound less than 14 weeks



HISTORY: Vaginal bleeding.



TECHNIQUE: Transvaginal sonography with grayscale and M-mode Doppler duplex Doppler sonography



FINDINGS: Anteverted uterus measures 9.0 x 5.6 x 5.4 cm. 1 cm cervical nabothian cyst. There is an in
trauterine gestational sac with some surrounding chorion and the mean gestational sac diameter is 0.6
7 cm estimated sonographic gestational age of 5 weeks 3 days and date of delivery August 30, 2022. Th
ere is a miniscule subcentimeter subchorionic hemorrhage noted. No yolk sac or fetal pole evident. Le
ft ovary could not be visualized. No pelvic fluid. Right ovary measures 2.5 x 3.4 x 2.2 cm with a few
 follicles and intact blood flow. No evidence of a right adnexal mass.



IMPRESSION:

1. Single intrauterine gestational sac with no yolk sac or fetal pole at this time with estimated son
ographic gestational age of 5 weeks 3 days based on the mean gestational sac diameter. This most like
ly represents a very early intrauterine pregnancy with no detectable embryo due to the early gestatio
nal age at this time. Clinical and sonographic follow-up is advised to document eventual development 
of a viable fetus to exclude the possibility of anembryonic gestation, or the less likely possibility
 of a pseudogestational sac from a sonographically occult ectopic.

2. Left ovary could not be visualized. The right ovary is normal.



Electronically signed by: Gavin Patricio MD (12/31/2021 11:37 PM) [facility]